# Patient Record
Sex: FEMALE | Race: BLACK OR AFRICAN AMERICAN | NOT HISPANIC OR LATINO | Employment: FULL TIME | URBAN - METROPOLITAN AREA
[De-identification: names, ages, dates, MRNs, and addresses within clinical notes are randomized per-mention and may not be internally consistent; named-entity substitution may affect disease eponyms.]

---

## 2021-12-28 ENCOUNTER — TRANSCRIBE ORDERS (OUTPATIENT)
Dept: PHYSICAL THERAPY | Facility: CLINIC | Age: 61
End: 2021-12-28

## 2021-12-28 DIAGNOSIS — M25.531 RIGHT WRIST PAIN: Primary | ICD-10-CM

## 2021-12-28 DIAGNOSIS — M79.601 PAIN OF RIGHT UPPER EXTREMITY: ICD-10-CM

## 2021-12-29 ENCOUNTER — TELEPHONE (OUTPATIENT)
Dept: PHYSICAL THERAPY | Facility: CLINIC | Age: 61
End: 2021-12-29

## 2022-01-06 ENCOUNTER — TREATMENT (OUTPATIENT)
Dept: PHYSICAL THERAPY | Facility: CLINIC | Age: 62
End: 2022-01-06

## 2022-01-06 DIAGNOSIS — S63.501D SPRAIN OF RIGHT WRIST, SUBSEQUENT ENCOUNTER: Primary | ICD-10-CM

## 2022-01-06 PROCEDURE — 97161 PT EVAL LOW COMPLEX 20 MIN: CPT | Performed by: PHYSICAL THERAPIST

## 2022-01-06 PROCEDURE — 97110 THERAPEUTIC EXERCISES: CPT | Performed by: PHYSICAL THERAPIST

## 2022-01-06 NOTE — PROGRESS NOTES
Physical Therapy Initial Evaluation and Plan of Care    Patient: Ivelisse Bonds   : 1960  Diagnosis/ICD-10 Code:  Sprain of right wrist, subsequent encounter [S63.501D]  Referring practitioner: ALYSHA Ugarte  Date of Initial Visit: 2022  Today's Date: 2022  Patient seen for 1 session         Visit Diagnoses:    ICD-10-CM ICD-9-CM   1. Sprain of right wrist, subsequent encounter  S63.501D V58.89     842.00         Subjective Questionnaire: QuickDASH: 77.27      Subjective Evaluation    History of Present Illness  Date of onset: 2021  Mechanism of injury: Patient injured her wrist while at work in 2021.  Patient began working there in 2021.  Patient is a  which require her to lift a lot of metal and steel.  Reports that the wrist started hurting due to the repetitively lifting.  Patient saw the nurse at work in Nov/Dec.  Then recently saw the providers at our facility.      Patient Occupation: [employer]   Precautions and Work Restrictions: light dutyPain  Current pain ratin  At worst pain ratin  Location: right medial wrist  Quality: unsure how to describe it, it just hurts.  Relieving factors: medications  Exacerbated by: nothing specific.  Progression: worsening    Hand dominance: right             Objective          Observations     Additional Wrist/Hand Observation Details  Wrist splint on the right wrist.    Palpation     Additional Palpation Details  TTP to the right medial ulnar wrist.    Active Range of Motion     Right Wrist   Wrist flexion: 41 degrees   Wrist extension: 65 degrees   Radial deviation: 6 degrees   Ulnar deviation: 40 degrees     Strength/Myotome Testing     Right Wrist/Hand   Wrist extension: 4+  Wrist flexion: 5  Radial deviation: 5  Ulnar deviation: 5    Tests     Additional Tests Details  + Varus Stress Test  - Valgus Stress Test          Assessment & Plan     Assessment  Impairments: abnormal or restricted ROM, activity intolerance, lacks  appropriate home exercise program and pain with function    Assessment details: Patient presents with c/o pain, TTP, limited AROM and positive special testing which is limiting her ability to perform full job duties.    Barriers to therapy: none  Prognosis: good  Prognosis details: STG's  1)  Independent with HEP  2)  Decrease pain by 50% or more  3)  AROM WNL for the right wrist    LTG's   1)  Independent with HEP progression  2)  Decrease pain by 75% or more  3)  Negative special testing  4)  No TTP present  5)  Patient to lift waist to waist up to 35 lbs      Plan  Therapy options: will be seen for skilled therapy services  Planned therapy interventions: strengthening, stretching, home exercise program, therapeutic activities and neuromuscular re-education  Treatment plan discussed with: patient            Timed:         Manual Therapy:    0     mins  05326;     Therapeutic Exercise:    20     mins  39735;     Neuromuscular Amelie:    0    mins  87918;    Therapeutic Activity:     0     mins  48663;     Gait Trainin     mins  57424;     Ultrasound:     0     mins  23285;          Un-Timed:  Electrical Stimulation:    0     mins  33013 ( );    Low Eval     10     Mins  30474  Mod Eval     0     Mins  96767  High Eval                       0     Mins  20294        Timed Treatment:   20   mins   Total Treatment:     30   mins          PT: Landry Lau, PT     [place] License [id number]  Electronically signed by Landry Lau PT, 22, 9:51 AM EST    Certification Period: 2022 thru 2022  I certify that the therapy services are furnished while this patient is under my care.  The services outlined above are required by this patient, and will be reviewed every 90 days.         Physician Signature:__________________________________________________    PHYSICIAN: Chantal Acosta PA      DATE:     Please sign and return via fax to [email or web address] . Thank you, [facility].

## 2022-01-10 ENCOUNTER — TREATMENT (OUTPATIENT)
Dept: PHYSICAL THERAPY | Facility: CLINIC | Age: 62
End: 2022-01-10

## 2022-01-10 DIAGNOSIS — S63.501D SPRAIN OF RIGHT WRIST, SUBSEQUENT ENCOUNTER: Primary | ICD-10-CM

## 2022-01-10 PROCEDURE — 97110 THERAPEUTIC EXERCISES: CPT | Performed by: PHYSICAL THERAPIST

## 2022-01-10 PROCEDURE — 97112 NEUROMUSCULAR REEDUCATION: CPT | Performed by: PHYSICAL THERAPIST

## 2022-01-10 NOTE — PROGRESS NOTES
Physical Therapy Daily Treatment Note      Patient: Ivelisse Bonds   : 1960  Referring practitioner: AYLSHA Ugarte  Date of Initial Visit: Type: THERAPY  Noted: 2022  Today's Date: 1/10/2022  Patient seen for 2 sessions       Visit Diagnoses:    ICD-10-CM ICD-9-CM   1. Sprain of right wrist, subsequent encounter  S63.501D V58.89     842.00         Subjective Patient reports that the wrist is the same, continues to have pain in the right ulnar wrist, but reports pain at the base of the thumb today; rates the pain at 6/10 today.  States that she has not been able to do the HEP since she was in here last.    Objective   See Exercise, Manual, and Modality Logs for complete treatment.       Assessment/Plan  Subjective reports are elevated from the previous visit (5/10).  No significant increase in the exercise routine secondary to the patient not being compliant with the HEP.  Patient had pain with the majority of the exercises.  Added KT to the right wrist to help with the S/S's.        Timed:         Manual Therapy:    0     mins  38183;     Therapeutic Exercise:    26     mins  71302;    Neuromuscular Amelie:    8    mins  01962;    Therapeutic Activity:     0     mins  95288;     Gait Trainin     mins  61270;     Ultrasound:     0     mins  46861;    Work Conditioning      __0_  mins  46474      Un-Timed:  Electrical Stimulation:    0     mins  77793 ( );        Timed Treatment:   34   mins   Total Treatment:     34   mins    Landry Lau, PT  [place] License: [id number]

## 2022-01-24 ENCOUNTER — TREATMENT (OUTPATIENT)
Dept: PHYSICAL THERAPY | Facility: CLINIC | Age: 62
End: 2022-01-24

## 2022-01-24 DIAGNOSIS — S63.501D SPRAIN OF RIGHT WRIST, SUBSEQUENT ENCOUNTER: Primary | ICD-10-CM

## 2022-01-24 PROCEDURE — 97112 NEUROMUSCULAR REEDUCATION: CPT | Performed by: PHYSICAL THERAPIST

## 2022-01-24 PROCEDURE — 97110 THERAPEUTIC EXERCISES: CPT | Performed by: PHYSICAL THERAPIST

## 2022-01-24 NOTE — PROGRESS NOTES
Physical Therapy Daily Treatment Note      Patient: Ivelisse Bonds   : 1960  Referring practitioner: ALYSHA Ugarte  Date of Initial Visit: Type: THERAPY  Noted: 2022  Today's Date: 2022  Patient seen for 3 sessions       Visit Diagnoses:    ICD-10-CM ICD-9-CM   1. Sprain of right wrist, subsequent encounter  S63.501D V58.89     842.00         Subjective Patient reports that her wrist is ok now.  Reports that she tripped and fell Thursday night and landed on her right side.  Currently denies pain in the wrist.    Objective   See Exercise, Manual, and Modality Logs for complete treatment.       Assessment/Plan  Subjective reports are greatly improved form the previous visit (6/10).  Patient tolerated the progression of exercises without visual or verbal reports of pain.  Plan to progress the strengthening exercises as the patient can tolerate.    Awaiting appointment with the hand specialist.    Timed:         Manual Therapy:    0     mins  29242;     Therapeutic Exercise:    23     mins  81426;     Neuromuscular Amelie:    8    mins  03381;    Therapeutic Activity:     0     mins  00744;     Gait Trainin     mins  04533;     Ultrasound:     0     mins  82087;    Work Conditioning      __0_  mins  71345      Un-Timed:  Electrical Stimulation:    0     mins  71982 ( );        Timed Treatment:   31   mins   Total Treatment:     31   mins    Landry Lau, PT  [place] License: [id number]

## 2022-02-22 ENCOUNTER — DOCUMENTATION (OUTPATIENT)
Dept: PHYSICAL THERAPY | Facility: CLINIC | Age: 62
End: 2022-02-22